# Patient Record
Sex: FEMALE | Race: WHITE | NOT HISPANIC OR LATINO | Employment: FULL TIME | URBAN - METROPOLITAN AREA
[De-identification: names, ages, dates, MRNs, and addresses within clinical notes are randomized per-mention and may not be internally consistent; named-entity substitution may affect disease eponyms.]

---

## 2018-03-25 ENCOUNTER — HOSPITAL ENCOUNTER (EMERGENCY)
Facility: HOSPITAL | Age: 28
Discharge: HOME | End: 2018-03-25
Attending: EMERGENCY MEDICINE | Admitting: EMERGENCY MEDICINE
Payer: OTHER MISCELLANEOUS

## 2018-03-25 VITALS
WEIGHT: 178 LBS | HEART RATE: 83 BPM | DIASTOLIC BLOOD PRESSURE: 70 MMHG | RESPIRATION RATE: 16 BRPM | OXYGEN SATURATION: 100 % | TEMPERATURE: 97.8 F | SYSTOLIC BLOOD PRESSURE: 119 MMHG
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
## 2018-03-25 DIAGNOSIS — T30.0 HOT LIQUID BURN: Primary | ICD-10-CM

## 2018-03-25 PROCEDURE — 99283 EMERGENCY DEPT VISIT LOW MDM: CPT

## 2018-03-25 PROCEDURE — 90471 IMMUNIZATION ADMIN: CPT

## 2018-03-25 PROCEDURE — 90715 TDAP VACCINE 7 YRS/> IM: CPT | Performed by: EMERGENCY MEDICINE

## 2018-03-25 RX ORDER — GINSENG 100 MG
1 CAPSULE ORAL ONCE
Status: COMPLETED | OUTPATIENT
Start: 2018-03-25 | End: 2018-03-25

## 2018-03-25 RX ADMIN — BACITRACIN ZINC 1 LARGE APPLICATION: 500 OINTMENT TOPICAL at 17:27

## 2018-03-25 RX ADMIN — TETANUS TOXOID, REDUCED DIPHTHERIA TOXOID AND ACELLULAR PERTUSSIS VACCINE, ADSORBED 0.5 ML: 5; 2.5; 8; 8; 2.5 SUSPENSION INTRAMUSCULAR at 17:26

## 2018-03-25 NOTE — DISCHARGE INSTRUCTIONS
Second Degree Burn   WHAT YOU NEED TO KNOW:   What is a second degree burn? A second degree burn is also called a partial thickness burn  Your skin contains 3 layers  A second degree burn occurs when the first layer and some of the second layer are burned  This type of burn usually heals within 2 to 3 weeks with some scarring  What are the types of a second degree burn? · A superficial second degree burn  includes the first layer and some of the second layer  There is no damage in the deeper layers or in the sweat glands or oil glands  · A deep second degree burn  includes damage in the middle layer, and in the sweat glands and oil glands  What causes a second degree burn? Direct exposure to heat or flame is the most common cause of second degree burn  This includes contact with hot objects or flames such as an iron, a skillet, tar, cigarettes, or fireworks  The following may also cause a second degree burn:  · Harsh chemicals, such as cleaning products, car battery acid, gasoline, or cement    · Damaged electrical cords or electrical outlets    · Hot water or steam    · Exposure to harmful rays from the sun or from tanning beds  What are the signs and symptoms of a second degree burn? · Superficial second degree burn: The skin is red, moist, very painful to the touch, and has blisters  Areas of redness turn white when pressure is applied  The area returns to red quickly when the pressure is removed  · Deep second degree burn: The skin is mixed red or waxy white, wet or moist, and has no blisters  Some areas of redness may turn white when pressure is applied  The area may return to red slowly or not all when the pressure is removed  How is a second degree burn diagnosed? Your healthcare provider will ask how you were burned  Tell him about your symptoms  He will examine your burn and determine how severe it is  Laser scanners may be used to check the blood flow in your skin     How is a second degree burn treated? · Medicines  may be used to decrease pain, prevent infection, or help your burn heal  They may be given as a pill or as an ointment applied to your skin  · Surgery  may remove damaged tissue, replace or cover lost skin, or relieve pressure and improve blood flow  Surgery can help prevent infection, decrease inflammation, and improve healing  Surgery can also improve the appearance of your skin and reduce scarring  How do I care for my second degree burn? · Wash your hands with soap and water and remove old bandages  You may need to soak the bandage in water before you remove it so it will not stick to your wound  · Gently clean the burned area daily with mild soap and water, and pat dry  Look for any swelling or redness around the burn  Do not break closed blisters, because this increases the risk for infection  · Apply cream or ointment to the burn with a cotton swab  Place a nonstick bandage over your burn  · Wrap a layer of gauze around the bandage to hold it in place  The wrap should be snug but not tight  It is too tight if you feel tingling or lose feeling in that area  · Apply gentle pressure for a few minutes if bleeding occurs  · Elevate your burned arm or leg above the level of your heart as often as you can  This will help decrease swelling and pain  Prop your burned arm or leg on pillows or blankets to keep it elevated comfortably  Why may I need physical therapy? Your muscles and joints may not work well after a second degree burn  A physical therapist teaches you exercises to help improve movement and strength, and to decrease pain  How can I prevent second degree burns? · Do not leave cups, mugs, or bowls containing hot liquids at the edge of a table  Keep pot handles turned away from the stove front  · Do not leave a lit cigarette  Discard it properly   Keep cigarette lighters and matches in a safe place where children cannot reach them     · Keep your water heater setting to low or medium  When should I seek immediate care? · You have a fast heartbeat or breathing  · You are not urinating  When should I contact my healthcare provider? · You have a fever  · You have increased redness, numbness, or swelling in the burn area  · Your wound or bandage is leaking pus and has a bad smell  · Your pain does not get better, or gets worse, even after you take pain medicine  · You have a dry mouth or eyes  · You are overly thirsty or tired  · You have dark yellow urine or urinate less than usual     · You have a headache or feel dizzy  · You have questions or concerns about your condition or care  CARE AGREEMENT:   You have the right to help plan your care  Learn about your health condition and how it may be treated  Discuss treatment options with your caregivers to decide what care you want to receive  You always have the right to refuse treatment  The above information is an  only  It is not intended as medical advice for individual conditions or treatments  Talk to your doctor, nurse or pharmacist before following any medical regimen to see if it is safe and effective for you  © 2017 [employer]  Information is for End User's use only and may not be sold, redistributed or otherwise used for commercial purposes  All illustrations and images included in CareNotes® are the copyrighted property of [employer]  or [employer] Frias  Superficial Burn   WHAT YOU NEED TO KNOW:   What is a superficial burn? A superficial burn, or first-degree burn, is when the outer layer of skin has been burned  What causes a superficial burn? Direct exposure to heat is the most common cause of superficial burn  This includes contact with hot objects or flames, such as an iron, a skillet, cigarettes, or fireworks   The following may also cause a superficial burn:  · Harsh chemicals, such as cleaning products, car battery acid, gasoline, lime, or chlorine    · Damaged electrical cords or electrical outlets    · Hot water or steam     · Sunburns or tanning beds  What are the signs and symptoms of a superficial burn? · Red, dry, tender skin    · Swelling    · Area turns white when touched  How is a superficial burn treated? A superficial burn usually heals in 3 to 5 days without scarring or blisters  Use the following first-aid guide to treat your burn:  · Remove clothing and jewelry immediately  · Flush liquid chemicals from your skin completely with large amounts of cool running water  Do not splash the chemicals into your eyes  · Brush dry chemicals off your skin if large amounts of water are not available  Small amounts of water will activate some chemicals, such as lime, and cause more damage  Do not splash the chemicals into your eyes  · Run cool or cold temperature water over the burned area for 10 minutes  A cold or cool compress can also be put on the burn  Do not use ice or ice water on the affected area  This may cause more damage to the skin  · Use an antibacterial ointment or skin cream, such as aloe vera cream, to soothe the skin  Do not put butter, petroleum jelly, or other home remedies on skin burned by a chemical     · Do not put a bandage on the burn until you are told to do so by your healthcare provider  How can I prevent superficial burns? · Do not leave cups, mugs, or bowls containing hot liquids at the edge of a table  Keep pot handles turned away from the stove front  · Do not leave a lit cigarette  Discard it properly  Keep cigarette lighters and matches in a safe place where children cannot reach them  · Keep your water heater setting to low or medium (90°F to 120°F, or 32°C to 48°C)  · Wear sunscreen that has a sun protectant factor (SPF) of 15 or higher  The sunscreen should also have ultraviolet A (UVA) and ultraviolet B (UVB) protection   Follow the directions on the label when you use sunscreen  Put on more sunscreen if you are in the sun for more than an hour  Reapply sunscreen often if you go swimming or are sweating  Call 911 for any of the following:   · You have trouble breathing  When should I seek immediate care? · You have a burn to the face, neck, hands, or genitals  · Your burn covers a large area such as your trunk or entire arm or leg  · You have increased redness, numbness, or swelling in the superficial burn area  · You have red streaks or blisters spreading outward from the superficial burn  · Your pain is not relieved, or is getting worse even after you take medicine  When should I contact my healthcare provider? · You have a fever  · You have questions or concerns about your condition or care  CARE AGREEMENT:   You have the right to help plan your care  Learn about your health condition and how it may be treated  Discuss treatment options with your caregivers to decide what care you want to receive  You always have the right to refuse treatment  The above information is an  only  It is not intended as medical advice for individual conditions or treatments  Talk to your doctor, nurse or pharmacist before following any medical regimen to see if it is safe and effective for you  © 2017 [employer] [place] Information is for End User's use only and may not be sold, redistributed or otherwise used for commercial purposes  All illustrations and images included in CareNotes® are the copyrighted property of [employer] M [employer]  or Seth Frias

## 2018-03-25 NOTE — ED PROVIDER NOTES
Pt Name: Svetlana Watson  MRN: [id number]  [place] 1990  Age/Sex: 32 y o  female  Date of evaluation: 3/25/2018  PCP: No primary care provider on file  CHIEF COMPLAINT    Chief Complaint   Patient presents with    Burn     patient presents to the ED with burn on left 2nd and 3rd digit after spilling hot coffee on hand at work          HPI    Yue Gilliamner presents to the Emergency Department complaining of burn to left index finger  She was at work and taking the coffee filter part of the  out  She did not realize it still had hot water/coffee in it  This happened at 1:30 PM   She has pain and swelling with some blistering as well  She has been putting a burn cream on it but it is not helping the pain  HPI      Past Medical and Surgical History    History reviewed  No pertinent past medical history  History reviewed  No pertinent surgical history  History reviewed  No pertinent family history  Social History   Substance Use Topics    Smoking status: Current Every Day Smoker     Packs/day: 1 00     Types: Cigarettes    Smokeless tobacco: Not on file    Alcohol use No              Allergies    No Known Allergies    Home Medications    Prior to Admission medications    Not on File           Review of Systems    Review of Systems   Constitutional: Negative for activity change, appetite change, chills, diaphoresis, fatigue and fever  HENT: Negative for congestion, postnasal drip, rhinorrhea, sinus pressure, sneezing and sore throat  Eyes: Negative for pain and visual disturbance  Respiratory: Negative for cough, chest tightness and shortness of breath  Cardiovascular: Negative for chest pain, palpitations and leg swelling  Gastrointestinal: Negative for abdominal distention, abdominal pain, constipation, diarrhea, nausea and vomiting  Endocrine: Negative for polydipsia, polyphagia and polyuria     Genitourinary: Negative for decreased urine volume, difficulty urinating, dysuria, flank pain, frequency and hematuria  Musculoskeletal: Negative for arthralgias, gait problem, joint swelling and neck pain  Skin: Negative for pallor and rash  Allergic/Immunologic: Negative for immunocompromised state  Neurological: Negative for syncope, speech difficulty, weakness, light-headedness, numbness and headaches  All other systems reviewed and are negative  Physical Exam      ED Triage Vitals [03/25/18 1623]   Temperature Pulse Respirations Blood Pressure SpO2   97 8 °F (36 6 °C) 83 16 119/70 100 %      Temp src Heart Rate Source Patient Position - Orthostatic VS BP Location FiO2 (%)   -- -- -- -- --      Pain Score       --               Physical Exam   Constitutional: She is oriented to person, place, and time  She appears well-developed and well-nourished  No distress  HENT:   Head: Normocephalic and atraumatic  Nose: Nose normal    Mouth/Throat: Oropharynx is clear and moist    Eyes: Conjunctivae, EOM and lids are normal  Pupils are equal, round, and reactive to light  Neck: Normal range of motion  Neck supple  Cardiovascular: Normal rate, regular rhythm and normal heart sounds  Exam reveals no gallop and no friction rub  No murmur heard  Pulmonary/Chest: Effort normal and breath sounds normal  No accessory muscle usage  No respiratory distress  She has no wheezes  She has no rales  Abdominal: Soft  She exhibits no distension  There is no tenderness  There is no rebound and no guarding  Musculoskeletal:        Hands:  Neurological: She is alert and oriented to person, place, and time  No cranial nerve deficit or sensory deficit  Skin: Skin is warm and dry  Burn noted  No rash noted  She is not diaphoretic  No erythema  Psychiatric: She has a normal mood and affect  Her speech is normal and behavior is normal  Judgment and thought content normal    Nursing note and vitals reviewed                   Assessment and Plan    Samra Richardson is a 32 y o  female who presents with burn to her hand  Physical examination remarkable for small area of blistering on left index finger  Plan will be to perform diagnostic testing and treat symptomatically  [facility]    Diagnostic Results      Labs:    No results found for this or any previous visit  All labs reviewed and utilized in the medical decision making process    Radiology:    No orders to display       All radiology studies independently viewed by me and interpreted by the radiologist     Procedure    Procedures    CritCare Time      ED Course of Care and Re-Assessments    Tetanus updated  Burn dressing applied  Medications   tetanus-diphtheria-acellular pertussis (BOOSTRIX) IM injection 0 5 mL (0 5 mL Intramuscular Given 3/25/18 1726)   bacitracin topical ointment 1 large application (1 large application Topical Given 3/25/18 1727)           FINAL IMPRESSION    Final diagnoses:   Hot liquid burn         DISPOSITION/PLAN    Time reflects when diagnosis was documented in both MDM as applicable and the Disposition within this note     Time User Action Codes Description Comment    3/25/2018  4:49 PM Gigi SLATER Add [T30 0,  X12  XXXA] Hot liquid burn       ED Disposition     ED Disposition Condition Comment    Discharge  Rosa Maria Burnham discharge to home/self care      Condition at discharge: Good        Follow-up Information     Follow up With Specialties Details Why Contact Info Additional Information    Barbie Seay MD Hand Surgery Schedule an appointment as soon as possible for a visit  [address] [place] [id number]  [address] [place] [address] Emergency Department Emergency Medicine Go to As needed, For wound re-check [address] [facility] [address] [place] ED, [address] [place], [place], [facility]            PATIENT REFERRED TO:    Barbie Seay MD  [address] [place] [id number]  [phone]    Schedule an appointment as soon as possible for a visit      [facility] Emergency Department  [address] [facility] [address] [phone]  Go to  As needed, For wound re-check      DISCHARGE MEDICATIONS:    There are no discharge medications for this patient  No discharge procedures on file           Domenico Peter, [address] [facility], DO  03/25/18 4306

## 2023-04-27 ENCOUNTER — OFFICE VISIT (OUTPATIENT)
Dept: OBGYN CLINIC | Facility: CLINIC | Age: 33
End: 2023-04-27

## 2023-04-27 VITALS
HEART RATE: 86 BPM | RESPIRATION RATE: 18 BRPM | BODY MASS INDEX: 30.83 KG/M2 | WEIGHT: 174 LBS | SYSTOLIC BLOOD PRESSURE: 122 MMHG | OXYGEN SATURATION: 99 % | DIASTOLIC BLOOD PRESSURE: 82 MMHG | HEIGHT: 63 IN

## 2023-04-27 DIAGNOSIS — S46.211A STRAIN OF RIGHT BICEPS, INITIAL ENCOUNTER: ICD-10-CM

## 2023-04-27 DIAGNOSIS — M75.81 TENDINITIS OF RIGHT ROTATOR CUFF: Primary | ICD-10-CM

## 2023-04-27 RX ORDER — NAPROXEN 500 MG/1
TABLET ORAL
COMMUNITY
Start: 2023-02-06

## 2023-04-27 NOTE — PROGRESS NOTES
"     Subjective:  Chief Complaint   Patient presents with   • Right Arm - Pain       Barrett Lawrence is a 35 y o  female complains of right shoulder pain  Onset of the symptoms was several weeks ago  Mechanism of injury: fell at work while moving box of [employer], fell onto her right shoulder   Aggravating factors: lifting items wiht her right shoulder  Treatment to date: sling   Symptoms have gradually improved  Patient was seen in the emergency room where initial radiographs were obtained revealing no acute fractures or dislocations  Patient was placed into a sling and advised to follow-up for initial evaluation in office today  The following portions of the patient's history were reviewed and updated as appropriate: allergies, current medications, past family history, past medical history, past social history, past surgical history and problem list     Occupation:      Review of Systems   Constitutional: Negative for fever  HENT: Negative for dental problem and headaches  Eyes: Negative for vision loss  Respiratory: Negative for cough and shortness of breath  Cardiovascular: Negative for leg swelling and palpitations  Gastrointestinal: Negative for constipation and diarrhea  Genitourinary: Negative for bladder incontinence and difficulty urinating  Musculoskeletal: Negative for back pain and difficulty walking  Skin: Negative for rash and ulcer  Neurological: Negative for dizziness and headaches  Hem/Lymph/Immuno: Negative for blood clots  Does not bruise/bleed easily  Psychiatric/Behavioral: Negative for confusion           Objective:  /82   Pulse 86   Resp 18   Ht 5' 3\" (1 6 m)   Wt 78 9 kg (174 lb)   SpO2 99%   BMI 30 82 kg/m²     Skin: no rashes, lesions, skin discolorations, lacerations  Vasculature: normal radial and ulnar pulse,  normal skin color, normal capillary refill in extremity, no upper extremity edema  Neurologic: Neurologic exam is normal throughout upper " extremities, Awake, alert, and oriented x3, no apparent distress  Musculoskeletal:   right SHOULDER EXAM    Intact skin  No erythema, no induration, no signs of infection  No gross deformity    AROM FF: 180 degrees  AROM ER with arm at its side: 90 degrees  AROM IR: 80 degrees    Grind test: negative    Supraspinatus strength testin/5  Infraspinatus strength testin/5    Belly press: negative  Bear Hug test: negative    Empty can: positive  Biceps TTP: positive  Arc test: positive    Wasco's test:negative  Scapular posturing: good    Tenderness to palpation of AC joint: negative  Pain with cross-body adduction: negative    Anterior glide: negative  Posterior load and shift: negative  Sulcus sign: negative    Positive speeds    Imaging:    XR shoulder 2+ views RIGHT    Result Date: 2023  Narrative: RIGHT SHOULDER INDICATION:   injury  Majo Freeze at work today  Right shoulder pain  COMPARISON:  None VIEWS:  XR SHOULDER 2+ VW RIGHT FINDINGS: There is no acute fracture or dislocation  No significant degenerative changes  No lytic or blastic osseous lesion  Soft tissues are unremarkable  Impression: No acute osseous abnormality  Workstation performed: [id number]     XR humerus RIGHT    Result Date: 2023  Narrative: RIGHT HUMERUS INDICATION:  Fall onto right arm, right arm pain  COMPARISON:  None VIEWS:  XR HUMERUS RIGHT Images: 2 FINDINGS: There is no acute fracture or dislocation  No significant degenerative changes  No lytic or blastic osseous lesion  Soft tissues are unremarkable  Impression: No acute osseous abnormality  Workstation performed: [id number]        Assessment/Plan:  1  Tendinitis of right rotator cuff    - Ambulatory Referral to Physical Therapy; Future    2   Strain of right biceps, initial encounter        > 30 min devoted to review of previous, pertinent medical records, imaging, discussion of treatment options, counseling and documentation  Clinical impression is that patient right shoulder pain is secondary to biceps strain and rotator cuff strain  She does not exhibit any weakness on exam with rotator cuff testing  No bruising or ecchymosis or swelling appreciated  We discussed the nature of rotator cuff strain, biceps strain at length and detailed the treatment approach  Directed to ice the area daily for 20 minutes at a time using a barrier to protect the skin- stressed specifically icing after activity to address inflammation  Start Naproxen 500 mg PO BID for 10 days with food, then to be used as needed moving forward  They were instructed to discontinue this medication is they experienced any upset stomach  Recommending formal PT- Rx provided for PT  Follow up in 6 weeks  Should sx's worsen or any concerns arise, they were advised to follow up sooner or seek more immediate medical attention  All of the patient's concerns were addressed and questions answered  They verbalized agreement with and understanding of the treatment plan

## 2023-05-09 ENCOUNTER — EVALUATION (OUTPATIENT)
Dept: PHYSICAL THERAPY | Facility: CLINIC | Age: 33
End: 2023-05-09

## 2023-05-09 DIAGNOSIS — M75.81 TENDINITIS OF RIGHT ROTATOR CUFF: Primary | ICD-10-CM

## 2023-05-09 NOTE — PROGRESS NOTES
PT Evaluation     Today's date: 2023  Patient name: Padmini Givens  : 1990  MRN: [id number]  Referring provider: Giulia Rouse DO  Dx:   Encounter Diagnosis     ICD-10-CM    1  Tendinitis of right rotator cuff  M75 81 Ambulatory Referral to Physical Therapy     PT plan of care cert/re-cert          Start Time: 930  Stop Time: 1015  Total time in clinic (min): 45 minutes    Assessment  Assessment details: Pt is a 35 y o  female presenting to physical therapy with chief complaint of R shoulder pain  Pt presents with decreased ROM of R shoulder and decreased strength of R rotator cuff muscles and scapular stabilizers  Pt's impairments are resulting in limitations with reaching, lifting and carrying objects, overhead activity, and work/household activities  PT POC will focus on improving R rotator cuff strength/stability, as well as promoting proper upright posture, in order to decrease shoulder impingement and improve pain free ROM  Pt is a good candidate for skilled PT to address impairments and facilitate return to prior level of function  Impairments: abnormal muscle tone, abnormal or restricted ROM, impaired physical strength, lacks appropriate home exercise program, pain with function and poor posture   Functional limitations: reaching, lifting and carrying objects, overhead activity, and work/household activities  Symptom irritability: moderateUnderstanding of Dx/Px/POC: good   Prognosis: good    Goals  STG 4 - weeks  1  Patient will demonstrate improved R shoulder flexion/abduction ROM by 10 degrees or more to facilitate functional ability  2  Patient will demonstrate improved R shoulder strength to 4/5 or better in all planes to facilitate functional ability  LTG 8 - weeks  1  Patient will demonstrate ability to lift 10 pounds or more overhead with good R shoulder mechanics to facilitate functional ability    2  Patient will demonstrate increased FOTO score from 36 at baseline to predicted score of 66 or higher to facilitate functional ability  Plan  Planned modality interventions: cryotherapy, thermotherapy: hydrocollator packs and unattended electrical stimulation  Planned therapy interventions: ADL retraining, activity modification, functional ROM exercises, home exercise program, joint mobilization, manual therapy, massage, neuromuscular re-education, patient education, strengthening, therapeutic exercise, therapeutic activities, stretching and postural training  Frequency: 2x week  Duration in weeks: 8  Plan of Care beginning date: 2023  Plan of Care expiration date: 2023  Treatment plan discussed with: patient        Subjective Evaluation    History of Present Illness  Mechanism of injury: Pt reports R shoulder pain secondary to fall at work on   Pt fell backwards and reached her R arm back to brace her fall  Pt had instant pain and went to ED and x-rays were negative  Pt placed in sling and then saw ortho who removed her from sling  Ortho also prescribed naproxen 2x/day which she has been taking  Pt reports pain has been improving since the incident, still getting pain shooting down her upper arm, no distal sx to her hand/fingers  Pt can't sleep on her R side, as well as having difficulty with overhead activities such as doing hair  Pt is back to work, has been receiving help from coworkers for carrying heavier objects  Pt not required for overhead lifting at work but needs to do floor to waist carrying, is getting some pain when she needs to pull upwards to lift    Pain  Current pain ratin  At best pain ratin  At worst pain ratin  Location: R shoulder and medial upper arm  Quality: throbbing (Thumping)  Relieving factors: rest  Aggravating factors: lifting and overhead activity  Progression: improved    Patient Goals  Patient goals for therapy: increased motion and decreased pain  Patient goal: Move her R arm without hurting        Objective     Postural Observations  Seated "posture: poor  Standing posture: poor    Additional Postural Observation Details  Forward head/rounded shoulder    Palpation   Left   No palpable tenderness to the upper trapezius  Right   Hypertonic in the upper trapezius  Tenderness     Additional Tenderness Details  No TTP R shoulder    Active Range of Motion   Cervical/Thoracic Spine       Cervical    Left lateral flexion: 30 degrees      Right lateral flexion: 25 degrees     with pain  Left Shoulder   Internal rotation BTB: T7     Right Shoulder   Flexion: 125 degrees   Abduction: 95 degrees   External rotation 45°: 55 degrees   Internal rotation 45°: 65 degrees   Internal rotation BTB: T12     Passive Range of Motion     Right Shoulder   Flexion: 105 degrees   Abduction: 95 degrees with pain  Mechanical Assessment    Cervical    Seated retraction: repeated movements   Pain location: no change  Seated Flexion:  repeated movements  Pain location: no change  Seated right sidebend: sustained positions  Pain intensity: worse    Thoracic      Lumbar      Strength/Myotome Testing     Left Shoulder     Planes of Motion   Flexion: 4+   Abduction: 4+   External rotation at 0°: 4+   External rotation at 90°: 4   Internal rotation at 0°: 5     Right Shoulder     Planes of Motion   Flexion: 4-   Abduction: 3   External rotation at 0°: 3+   External rotation at 90°: 3   Internal rotation at 0°: 4+     Left Elbow   Flexion: 4    Right Elbow   Flexion: 4-    Tests     Right Shoulder   Positive Hawkin's, Neer's and passive horizontal adduction  Negative empty can                Precautions: N/A      Manuals 5/9            PROM R shoulder                                                    Neuro Re-Ed             Pt education about posture, RTC, POC, HEP 10'            Corner pinches 1x10 5\"            Chin tucks 1x10 5\"            Serratus punch                                                    Ther Ex             Table slides flex 1x10 5\"            ER AAROM c cane " "1x10 5\"            ER/abd isometric             Pulleys                                                                 Ther Activity                                       Gait Training                                       Modalities                                          "

## 2023-05-16 ENCOUNTER — OFFICE VISIT (OUTPATIENT)
Dept: PHYSICAL THERAPY | Facility: CLINIC | Age: 33
End: 2023-05-16

## 2023-05-16 DIAGNOSIS — M75.81 TENDINITIS OF RIGHT ROTATOR CUFF: Primary | ICD-10-CM

## 2023-05-16 NOTE — PROGRESS NOTES
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 but after 3-4 she will have tingling to her elbow  Objective: See treatment diary below      Assessment: Initiated POC as charted with good tolerance  Patient will push herself, cues to rest and perform exercises within tolerable ROM  Improved ROM with PROM able to elevate above shoulder height  She was more challenge with AAROM in supine achieving around 70-80* flex  Added no moneys RTC strengthening, she had some discomfort medial bicep but returned to baseline after each contraction  Patient demonstrated fatigue post treatment and would benefit from continued PT      Plan: Progress treatment as tolerated         Precautions: N/A      Manuals            PROM R shoulder                                                    Neuro Re-Ed             Pt education about posture, RTC, POC, HEP 10'            Corner pinches 1x10 5\" 2x10 5\"           Chin tucks 1x10 5\" 2x10 5\"           Serratus punch  supine 2x10 3\"           No moneys  Peach 2x10                                      Ther Ex             Table slides flex 1x10 5\"  [id number] ea 5\"           ER AAROM c cane 1x10 5\" 2x10 5\"           ER/abd isometric  abd 1x10 5\"           Pulleys  3' scaption            Cane flexion AAROM  supine 2x10                                                   Ther Activity             Box lifts  + squat mechanics  2'  Box only 10x                        Gait Training                                       Modalities                                            "

## 2023-05-18 ENCOUNTER — APPOINTMENT (OUTPATIENT)
Dept: PHYSICAL THERAPY | Facility: CLINIC | Age: 33
End: 2023-05-18
Payer: COMMERCIAL

## 2023-05-23 ENCOUNTER — APPOINTMENT (OUTPATIENT)
Dept: PHYSICAL THERAPY | Facility: CLINIC | Age: 33
End: 2023-05-23
Payer: COMMERCIAL

## 2023-05-23 NOTE — PROGRESS NOTES
"Daily Note     Today's date: 2023  Patient name: Sylvain Borrero  : 1990  MRN: [id number]  Referring provider: Wilma Contreras DO  Dx: No diagnosis found  Subjective: ***      Objective: See treatment diary below      Assessment: Tolerated treatment {Tolerated treatment :}   Patient {assessment:[id number]}      Plan: {PLAN:}     Precautions: N/A      Manuals            PROM R shoulder                                                    Neuro Re-Ed             Pt education about posture, RTC, POC, HEP 10'            Corner pinches 1x10 5\" 2x10 5\"           Chin tucks 1x10 5\" 2x10 5\"           Serratus punch  supine 2x10 3\"           No moneys  Peach 2x10                                      Ther Ex             Table slides flex 1x10 5\"  [id number] ea 5\"           ER AAROM c cane 1x10 5\" 2x10 5\"           ER/abd isometric  abd 1x10 5\"           Pulleys  3' scaption            Cane flexion AAROM  supine 2x10                                                   Ther Activity             Box lifts  + squat mechanics  2'  Box only 10x                        Gait Training                                       Modalities                                            "

## 2023-05-25 ENCOUNTER — OFFICE VISIT (OUTPATIENT)
Dept: PHYSICAL THERAPY | Facility: CLINIC | Age: 33
End: 2023-05-25

## 2023-05-25 DIAGNOSIS — M75.81 TENDINITIS OF RIGHT ROTATOR CUFF: Primary | ICD-10-CM

## 2023-05-30 ENCOUNTER — APPOINTMENT (OUTPATIENT)
Dept: PHYSICAL THERAPY | Facility: CLINIC | Age: 33
End: 2023-05-30
Payer: COMMERCIAL

## 2023-06-06 ENCOUNTER — OFFICE VISIT (OUTPATIENT)
Dept: PHYSICAL THERAPY | Facility: CLINIC | Age: 33
End: 2023-06-06
Payer: COMMERCIAL

## 2023-06-06 DIAGNOSIS — M75.81 TENDINITIS OF RIGHT ROTATOR CUFF: Primary | ICD-10-CM

## 2023-06-06 PROCEDURE — 97530 THERAPEUTIC ACTIVITIES: CPT

## 2023-06-06 PROCEDURE — 97110 THERAPEUTIC EXERCISES: CPT

## 2023-06-06 PROCEDURE — 97140 MANUAL THERAPY 1/> REGIONS: CPT

## 2023-06-06 NOTE — PROGRESS NOTES
"Daily Note     Today's date: 2023  Patient name: Bernard Cantor  : 1990  MRN: [id number]  Referring provider: Madyson Jamison DO  Dx:   Encounter Diagnosis     ICD-10-CM    1  Tendinitis of right rotator cuff  M75 81           Start Time: 1230  Stop Time: 1315  Total time in clinic (min): 45 minutes    Subjective: Pt reports overall her shoulder has been feeling better, able to lift it higher now  Pt still having some difficulty lifting heavier objects  Objective: See treatment diary below      Assessment: Tolerated treatment well  Patient demonstrated fatigue post treatment, exhibited good technique with therapeutic exercises and would benefit from continued PT  Pt demonstrating good progress with shoulder ROM to this point, still mostly limited with abduction but responded well to manuals  Progressed strengthening exercises with overhead lifting as tolerated, pt reports slight soreness after performance but no significant discomfort  Plan: Continue per plan of care  Progress treatment as tolerated         Precautions: N/A      Manuals  6/6         PROM R shoulder flex/abd    6'         R shoulder inferior mobs    3' end range                                   Neuro Re-Ed             Pt education about HEP 10'   4'         Corner pinches 1x10 5\" 2x10 5\" 2x10 5\"          Chin tucks 1x10 5\" 2x10 5\" 2x10 5\" 2x10 5\"         Serratus punch  supine 2x10 3\" supine 2x10 3\"          No moneys  Peach 2x10  orange 3x10                                     Ther Ex             Table slides scap 1x10 5\"  [id number] ea 5\" 1x15 5\" 1x15 5\"          ER AAROM c cane 1x10 5\" 2x10 5\" 2x10 5\"          ER/abd isometric  abd 1x10 5\" 1x15 5\" ea          Pulleys  3' scaption  3' scaptio 3' scaptio         Cane flexion AAROM  supine 2x10  supine 2x10           Sleeper stretch    10x10\"         UBE    2'/3'         TB ER     2x10 ylw         Ther Activity             Box lifts  + squat mechanics  2'  Box only " 10x + squat mechanics  2'  Box only 10x 2x5 overhead passes to stairs; box only         Standing flexion to 90     2x10 5#         Gait Training                                       Modalities

## 2023-06-08 ENCOUNTER — OFFICE VISIT (OUTPATIENT)
Dept: PHYSICAL THERAPY | Facility: CLINIC | Age: 33
End: 2023-06-08
Payer: COMMERCIAL

## 2023-06-08 ENCOUNTER — OFFICE VISIT (OUTPATIENT)
Dept: OBGYN CLINIC | Facility: CLINIC | Age: 33
End: 2023-06-08
Payer: COMMERCIAL

## 2023-06-08 VITALS
DIASTOLIC BLOOD PRESSURE: 72 MMHG | SYSTOLIC BLOOD PRESSURE: 150 MMHG | HEIGHT: 63 IN | BODY MASS INDEX: 30.65 KG/M2 | WEIGHT: 173 LBS | HEART RATE: 71 BPM

## 2023-06-08 DIAGNOSIS — M75.81 TENDINITIS OF RIGHT ROTATOR CUFF: Primary | ICD-10-CM

## 2023-06-08 DIAGNOSIS — R20.2 PARESTHESIA OF RIGHT ARM: ICD-10-CM

## 2023-06-08 PROCEDURE — 97112 NEUROMUSCULAR REEDUCATION: CPT

## 2023-06-08 PROCEDURE — 99213 OFFICE O/P EST LOW 20 MIN: CPT | Performed by: FAMILY MEDICINE

## 2023-06-08 PROCEDURE — 97110 THERAPEUTIC EXERCISES: CPT

## 2023-06-08 NOTE — PROGRESS NOTES
"     Subjective:  Chief Complaint   Patient presents with   • Right Shoulder - Pain, Follow-up       Dessie Boeck is a 35 y o  female presenting for follow-up visit in regards to right shoulder pain  Patient sustained the injury several weeks ago while moving a small box of Gatorade falling onto the right shoulder  She was referred to physical therapy for suspected rotator cuff tendinitis and has reported significant improvement in terms of pain symptoms and motion  She reports about 85% improvement from initial evaluation  She does report occasional numbness and tingling on the medial aspect of the arm which does not go distal the elbow  She denies any neck pain  The following portions of the patient's history were reviewed and updated as appropriate: allergies, current medications, past family history, past medical history, past social history, past surgical history and problem list     Occupation:      Review of Systems   Constitutional: Negative for fever  HENT: Negative for dental problem and headaches  Eyes: Negative for vision loss  Respiratory: Negative for cough and shortness of breath  Cardiovascular: Negative for leg swelling and palpitations  Gastrointestinal: Negative for constipation and diarrhea  Genitourinary: Negative for bladder incontinence and difficulty urinating  Musculoskeletal: Negative for back pain and difficulty walking  Skin: Negative for rash and ulcer  Neurological: Negative for dizziness and headaches  Hem/Lymph/Immuno: Negative for blood clots  Does not bruise/bleed easily  Psychiatric/Behavioral: Negative for confusion           Objective:  /72   Pulse 71   Ht 5' 3\" (1 6 m)   Wt 78 5 kg (173 lb)   BMI 30 65 kg/m²     Skin: no rashes, lesions, skin discolorations, lacerations  Vasculature: normal radial and ulnar pulse,  normal skin color, normal capillary refill in extremity, no upper extremity edema  Neurologic: Neurologic exam is normal " throughout upper extremities, Awake, alert, and oriented x3, no apparent distress  Musculoskeletal:   right SHOULDER EXAM    Intact skin  No erythema, no induration, no signs of infection  No gross deformity    AROM FF: 180 degrees  AROM ER with arm at its side: 90 degrees  AROM IR: 80 degrees    Grind test: negative    Supraspinatus strength testin/5  Infraspinatus strength testin/5    Belly press: negative  Bear Hug test: negative    Empty can: positive  Biceps TTP: positive  Arc test: positive    Lewis and [employer]'s test:negative  Scapular posturing: good    Tenderness to palpation of AC joint: negative  Pain with cross-body adduction: negative    Anterior glide: negative  Posterior load and shift: negative  Sulcus sign: negative    Negative speeds    Negative Spurling's  No sensory deficits to light touch in the upper extremities bilaterally  5 out of 5 strength with biceps and triceps testing      Imaging:    XR shoulder 2+ views RIGHT    Result Date: 2023  Narrative: RIGHT SHOULDER INDICATION:   injury  Margi Caras at work today  Right shoulder pain  COMPARISON:  None VIEWS:  XR SHOULDER 2+ VW RIGHT FINDINGS: There is no acute fracture or dislocation  No significant degenerative changes  No lytic or blastic osseous lesion  Soft tissues are unremarkable  Impression: No acute osseous abnormality  Workstation performed: [id number]     XR humerus RIGHT    Result Date: 2023  Narrative: RIGHT HUMERUS INDICATION:  Fall onto right arm, right arm pain  COMPARISON:  None VIEWS:  XR HUMERUS RIGHT Images: 2 FINDINGS: There is no acute fracture or dislocation  No significant degenerative changes  No lytic or blastic osseous lesion  Soft tissues are unremarkable  Impression: No acute osseous abnormality  Workstation performed: [id number]        Assessment/Plan:  1  Tendinitis of right rotator cuff      2  Paresthesia of right arm    - EMG 1 Limb;  Future    Patient has achieved improvement in terms of pain symptoms with physical therapy for rotator cuff tendinitis  She does have occasional numbness and tingling in the medial aspect of the right arm which raises suspicion for possible concomitant brachial plexus versus cervical radiculopathy pathology  Rotator cuff strength testing has improved although she is exhibiting mild weakness with infraspinatus testing  I am recommending patient follow-up with an EMG study to evaluate for both brachial plexus versus cervical radiculopathy pathology  Advised patient to continue with physical therapy in the interim until EMG results return    We will follow-up once results return and discuss treatment plan moving forward

## 2023-06-08 NOTE — PROGRESS NOTES
"Daily Note     Today's date: 2023  Patient name: Lennox Service  : 1990  MRN: [id number]  Referring provider: Janie Rangel DO  Dx:   Encounter Diagnosis     ICD-10-CM    1  Tendinitis of right rotator cuff  M75 81           Start Time: 930  Stop Time: 1000  Total time in clinic (min): 30 minutes    Subjective: Pt reports feeling a little soreness after previous session, but not too bad and did not feel like she overdid it in PT  Objective: See treatment diary below      Assessment: Tolerated treatment well  Patient demonstrated fatigue post treatment, exhibited good technique with therapeutic exercises and would benefit from continued PT  Session abbreviated due to pt having ortho appointment and needing to leave early  Continued with PROM and joint mobs to facilitate regaining R shoulder abduction and progressed strengthening/stabilization exercises  Pt tolerated progressions well and will continue with rotator cuff strengthening progressions next session  Discussed PT POC with pt, pt agreeable that she has made good progress and would benefit from continued PT sessions to facilitate further functional improvement  Plan: Continue per plan of care  Progress treatment as tolerated         Precautions: N/A      Manuals         PROM R shoulder flex/abd    6' 4'        R shoulder inferior mobs    3' end range 2'                                  Neuro Re-Ed             Pt education  10'   4' 3'        Corner pinches 1x10 5\" 2x10 5\" 2x10 5\"          Chin tucks 1x10 5\" 2x10 5\" 2x10 5\" 2x10 5\"         Serratus punch  supine 2x10 3\" supine 2x10 3\"  2x10 against wall        No moneys  Peach 2x10  orange 3x10           TB rows     3x10 blue        Prone I/T     NV        Ther Ex             Table slides scap 1x10 5\"  [id number] ea 5\" 1x15 5\" 1x15 5\"          ER AAROM c cane 1x10 5\" 2x10 5\" 2x10 5\"          ER/abd isometric  abd 1x10 5\" 1x15 5\" ea          Pulleys  3' scaption  3' " "scaptio 3' scaptio         Cane flexion AAROM  supine 2x10  supine 2x10           Sleeper stretch    10x10\"         UBE    2'/3' 3'/3' lvl 1 3        TB ER     2x10 ylw 2x15 grn        TB ER punch up     NV        Ther Activity             Box lifts overhead to stairs c PT  + squat mechanics  2'  Box only 10x + squat mechanics  2'  Box only 10x 2x5 overhead passes to stairs; box only 1x5 box, 1x5 box+3#        Standing flexion to 90     2x10 5#         Gait Training                                       Modalities                                            "

## 2023-06-13 ENCOUNTER — APPOINTMENT (OUTPATIENT)
Dept: PHYSICAL THERAPY | Facility: CLINIC | Age: 33
End: 2023-06-13
Payer: COMMERCIAL

## 2023-06-20 ENCOUNTER — OFFICE VISIT (OUTPATIENT)
Dept: PHYSICAL THERAPY | Facility: CLINIC | Age: 33
End: 2023-06-20
Payer: COMMERCIAL

## 2023-06-20 DIAGNOSIS — M75.81 TENDINITIS OF RIGHT ROTATOR CUFF: Primary | ICD-10-CM

## 2023-06-20 PROCEDURE — 97530 THERAPEUTIC ACTIVITIES: CPT

## 2023-06-20 PROCEDURE — 97110 THERAPEUTIC EXERCISES: CPT

## 2023-06-20 PROCEDURE — 97140 MANUAL THERAPY 1/> REGIONS: CPT

## 2023-06-20 NOTE — PROGRESS NOTES
"Daily Note     Today's date: 2023  Patient name: Suri Smiley  : 1990  MRN: [id number]  Referring provider: Venessa Jarrett DO  Dx:   Encounter Diagnosis     ICD-10-CM    1  Tendinitis of right rotator cuff  M75 81           Start Time: 1015  Stop Time: 1100  Total time in clinic (min): 45 minutes    Subjective: Pt reports her shoulder isn't feeling too bad, but she pushed it at work yesterday and it was very sore  Objective: See treatment diary below      Assessment: Tolerated treatment well  Patient demonstrated fatigue post treatment, exhibited good technique with therapeutic exercises and would benefit from continued PT  Pt's RUE sx not brought on with upper limb tension test 2 for median nerve and not altered by cervical side bending when arm raised overhead  However, pt does demonstrate increased median nerve sensitivity in RUE compared to LUE when arm raised into abduction overhead  Performed some median nerve glides distally at wrist, pt tolerated fair, did have some soreness after performance and after rotator cuff strengthening exercises  Plan: Continue per plan of care  Progress treatment as tolerated  Precautions: N/A    POC expires Auth Status Unit limit Start date  Expiration date PT/OT + Visit Limit?     approved N/A 23 BOMN             Visit/Unit Tracking  AUTH Status:  Date   FOTO         12 visits Used      6          Remaining       6               Manuals  6       PROM R shoulder flex/abd    6' 4' 4'       R shoulder inferior mobs    3' end range 2' 3'       R median nerve glide      2' distal glide                    Neuro Re-Ed             Pt education  10'   4' 3' 2'       Corner pinches 1x10 5\" 2x10 5\" 2x10 5\"          Chin tucks 1x10 5\" 2x10 5\" 2x10 5\" 2x10 5\"  2x10 5\"       Serratus punch  supine 2x10 3\" supine 2x10 3\"  2x10 against wall 2x10 against wall       No moneys  Peach 2x10  orange 3x10        " "   TB rows     3x10 blue 3x10 blk       Prone I/T     NV 1x10 3\" ea         Ther Ex             Table slides scap 1x10 5\"  [id number] ea 5\" 1x15 5\" 1x15 5\"          Wall slides      1x15 5\"       Pulleys  3' scaption  3' scaptio 3' scaptio         Cane flexion AAROM  supine 2x10  supine 2x10           Sleeper stretch    10x10\"         UBE    2'/3' 3'/3' lvl 1 3 3'/3' lvl 1 5       TB ER     2x10 ylw 2x15 grn NP       TB ER punch up     NV x10 red       Ther Activity             Box lifts overhead to stairs c PT  + squat mechanics  2'  Box only 10x + squat mechanics  2'  Box only 10x 2x5 overhead passes to stairs; box only 1x5 box, 1x5 box+3# NP       Standing flexion to 90     2x10 5#         Gait Training                                       Modalities                                            "

## 2023-06-22 ENCOUNTER — APPOINTMENT (OUTPATIENT)
Dept: PHYSICAL THERAPY | Facility: CLINIC | Age: 33
End: 2023-06-22
Payer: COMMERCIAL

## 2023-06-27 ENCOUNTER — OFFICE VISIT (OUTPATIENT)
Dept: PHYSICAL THERAPY | Facility: CLINIC | Age: 33
End: 2023-06-27
Payer: COMMERCIAL

## 2023-06-27 DIAGNOSIS — M75.81 TENDINITIS OF RIGHT ROTATOR CUFF: Primary | ICD-10-CM

## 2023-06-27 PROCEDURE — 97110 THERAPEUTIC EXERCISES: CPT

## 2023-06-27 PROCEDURE — 97530 THERAPEUTIC ACTIVITIES: CPT

## 2023-06-27 NOTE — PROGRESS NOTES
"Daily Note     Today's date: 2023  Patient name: Merrick Sarabia  : 1990  MRN: [id number]  Referring provider: Matt Aggarwal DO  Dx:   Encounter Diagnosis     ICD-10-CM    1  Tendinitis of right rotator cuff  M75 81           Start Time: 1015  Stop Time: 1100  Total time in clinic (min): 45 minutes    Subjective: Patient reports she was sore after last session however she has been trying to push herself to do more  She reports she has been icing at home  Objective: See treatment diary below      Assessment: Tolerated treatment well  Cuing given throughout exercise performance with good carryover seen  Focused pm proper spine alignment during exercises as well as elimination of UT activation  Patient demonstrated fatigue post treatment, exhibited good technique with therapeutic exercises and would benefit from continued PT to meet functional goals  Plan: Continue per plan of care  Progress treatment as tolerated  Precautions: N/A    POC expires Auth Status Unit limit Start date  Expiration date PT/OT + Visit Limit?  approved N/A 23 BOMN             Visit/Unit Tracking  AUTH Status:  Date  6  FOTO         12 visits Used      6          Remaining       6               Manuals       PROM R shoulder flex/abd    6' 4' 4' KL      R shoulder inferior mobs    3' end range 2' 3'       R median nerve glide      2' distal glide                    Neuro Re-Ed             Pt education  10'   4' 3' 2' R median nerve glide 10x      Corner pinches 1x10 5\" 2x10 5\" 2x10 5\"    2x10 5\"      Chin tucks 1x10 5\" 2x10 5\" 2x10 5\" 2x10 5\"  2x10 5\" 2x10 5\"      Serratus punch  supine 2x10 3\" supine 2x10 3\"  2x10 against wall 2x10 against wall 2x10 against wall      No moneys  Peach 2x10  orange 3x10           TB rows     3x10 blue 3x10 blk 3x10 blk      Prone I/T     NV 1x10 3\" ea  1x10 3\" ea        Ther Ex             Table slides scap 1x10 5\" " " [id number] ea 5\" 1x15 5\" 1x15 5\"          Wall slides      1x15 5\" 1x20 5\"      Pulleys  3' scaption  3' scaptio 3' scaptio         Cane flexion AAROM  supine 2x10  supine 2x10           Sleeper stretch    10x10\"         UBE    2'/3' 3'/3' lvl 1 3 3'/3' lvl 1 5 3'/3' lvl 3 fwd  lvl 1 5 bwd      TB ER     2x10 ylw 2x15 grn NP 2x15 grn      TB ER punch up     NV x10 red x10 red      Ther Activity             Box lifts overhead to stairs c PT  + squat mechanics  2'  Box only 10x + squat mechanics  2'  Box only 10x 2x5 overhead passes to stairs; box only 1x5 box, 1x5 box+3# NP       Standing flexion to 90     2x10 5#         Gait Training                                       Modalities                                            "

## 2023-06-29 ENCOUNTER — APPOINTMENT (OUTPATIENT)
Dept: PHYSICAL THERAPY | Facility: CLINIC | Age: 33
End: 2023-06-29
Payer: COMMERCIAL

## 2023-06-29 DIAGNOSIS — M75.81 TENDINITIS OF RIGHT ROTATOR CUFF: Primary | ICD-10-CM

## 2023-06-29 NOTE — PROGRESS NOTES
"Daily Note     Today's date: 2023  Patient name: Samra Yang  : 1990  MRN: [id number]  Referring provider: Dick Elizabeth DO  Dx:   Encounter Diagnosis     ICD-10-CM    1  Tendinitis of right rotator cuff  M75 81                      Subjective: ***      Objective: See treatment diary below      Assessment: Tolerated treatment {Tolerated treatment :3361111214}  Patient {assessment:[id number]}      Plan: {PLAN:3617990713}     Precautions: N/A    POC expires Auth Status Unit limit Start date  Expiration date PT/OT + Visit Limit?  approved N/A 23 [facility]             Visit/Unit Tracking  AUTH Status:  Date   FOTO         12 visits Used      6          Remaining       6               Manuals      PROM R shoulder flex/abd    6' 4' 4' KL      R shoulder inferior mobs    3' end range 2' 3'       R median nerve glide      2' distal glide                    Neuro Re-Ed             Pt education  10'   4' 3' 2' R median nerve glide 10x      Corner pinches 1x10 5\" 2x10 5\" 2x10 5\"    2x10 5\"      Chin tucks 1x10 5\" 2x10 5\" 2x10 5\" 2x10 5\"  2x10 5\" 2x10 5\"      Serratus punch  supine 2x10 3\" supine 2x10 3\"  2x10 against wall 2x10 against wall 2x10 against wall      No moneys  Peach 2x10  orange 3x10           TB rows     3x10 blue 3x10 blk 3x10 blk      Prone I/T     NV 1x10 3\" ea  1x10 3\" ea        Ther Ex             Table slides scap 1x10 5\"  [id number] ea 5\" 1x15 5\" 1x15 5\"          Wall slides      1x15 5\" 1x20 5\"      Pulleys  3' scaption  3' scaptio 3' scaptio         Cane flexion AAROM  supine 2x10  supine 2x10           Sleeper stretch    10x10\"         UBE    2'/3' 3'/3' lvl 1 3 3'/3' lvl 1 5 3'/3' lvl 3 fwd  lvl 1 5 bwd      TB ER     2x10 ylw 2x15 grn NP 2x15 grn      TB ER punch up     NV x10 red x10 red      Ther Activity             Box lifts overhead to stairs c PT  + squat mechanics  2'  Box only 10x + squat " mechanics  2'  Box only 10x 2x5 overhead passes to stairs; box only 1x5 box, 1x5 box+3# NP       Standing flexion to 90     2x10 5#         Gait Training                                       Modalities

## 2023-07-03 ENCOUNTER — APPOINTMENT (OUTPATIENT)
Dept: PHYSICAL THERAPY | Facility: CLINIC | Age: 33
End: 2023-07-03
Payer: COMMERCIAL

## 2023-07-11 ENCOUNTER — EVALUATION (OUTPATIENT)
Dept: PHYSICAL THERAPY | Facility: CLINIC | Age: 33
End: 2023-07-11
Payer: COMMERCIAL

## 2023-07-11 DIAGNOSIS — M75.81 TENDINITIS OF RIGHT ROTATOR CUFF: Primary | ICD-10-CM

## 2023-07-11 PROCEDURE — 97112 NEUROMUSCULAR REEDUCATION: CPT

## 2023-07-11 PROCEDURE — 97140 MANUAL THERAPY 1/> REGIONS: CPT

## 2023-07-11 PROCEDURE — 97110 THERAPEUTIC EXERCISES: CPT

## 2023-07-11 NOTE — PROGRESS NOTES
PT Re-Evaluation     Today's date: 2023  Patient name: Vishal Fenton  : 1990  MRN: [id number]  Referring provider: Heather Saravia DO  Dx:   Encounter Diagnosis     ICD-10-CM    1. Tendinitis of right rotator cuff  M75.81           Start Time: 08  Stop Time: 845  Total time in clinic (min): 40 minutes    Assessment  Assessment details: Pt is a 35 y.o. female who has been attending physical therapy with chief complaint of R shoulder pain. Upon re-evaluation, pt presents with improvements of R shoulder ROM and strength. Pt also reports improvements of pain and function. Pt does continue to demonstrate some lingering tightness of R shoulder and some continued rotator cuff weakness. Pt also reports continued tingling in R shoulder that was unaltered by cervical testing this session, but pt does demonstrate some R-sided median nerve sensitivity. Pt still has some functional limitations of houlder and decreased strength of R rotator cuff muscles and scapular stabilizers. Pt's impairments are resulting in limitations with lifting and carrying objects, overhead activity, and work/household activities. PT POC will continue to focus on improving R rotator cuff strength/stability, as well as promoting proper upright posture and reducing nerve sx. Pt is a good candidate for continued skilled PT to address impairments and facilitate return to prior level of function. Impairments: abnormal muscle tone, abnormal or restricted ROM, impaired physical strength, pain with function and poor posture   Functional limitations:  lifting and carrying objects, overhead activity, and work/household activities  Symptom irritability: moderateUnderstanding of Dx/Px/POC: good   Prognosis: good    Goals  STG 4 - weeks  1. Patient will demonstrate improved R shoulder flexion/abduction ROM by 10 degrees or more to facilitate functional ability. -met  2.  Patient will demonstrate improved R shoulder strength to 4/5 or better in all planes to facilitate functional ability. -met  LTG 8 - weeks  1. Patient will demonstrate ability to lift 10 pounds or more overhead with good R shoulder mechanics to facilitate functional ability. -met  2. Patient will demonstrate increased FOTO score from 36 at baseline to predicted score of 66 or higher to facilitate functional ability. -ongoing    Plan  Patient would benefit from: skilled physical therapy and PT eval  Planned modality interventions: cryotherapy, thermotherapy: hydrocollator packs and unattended electrical stimulation  Planned therapy interventions: ADL retraining, activity modification, functional ROM exercises, home exercise program, joint mobilization, manual therapy, massage, neuromuscular re-education, patient education, strengthening, therapeutic exercise, therapeutic activities, stretching, postural training and nerve gliding  Frequency: 2x week  Duration in weeks: 8  Plan of Care beginning date: 7/11/2023  Plan of Care expiration date: 9/5/2023  Treatment plan discussed with: patient        Subjective Evaluation    History of Present Illness  Mechanism of injury: Pt reports R shoulder pain secondary to fall at work on 4/13. Pt fell backwards and reached her R arm back to brace her fall. Pt had instant pain and went to ED and x-rays were negative. Pt placed in sling and then saw ortho who removed her from sling. Pt initially in a lot of pain which then slightly improved over time. Upon re-evaluation, pt reports significant improvement of her R shoulder. Pt reports improved strength and has been able to do more at work. Pt does still get some pain with repetitive and heavy lifting activities. Pt also reports she is still unable to lay on her R side, arm can be under pillow or under her side but either way her R arm will get tingly and numb within 2 minutes. Pt no longer needs prescription naproxen.  Pt feels about 80% back to normal.   Patient Goals  Patient goals for therapy: increased motion and decreased pain  Patient goal: Move her R arm without hurting  Pain  Current pain ratin  At best pain ratin  At worst pain ratin  Location: R shoulder and medial upper arm  Quality: throbbing (Thumping)  Relieving factors: rest  Aggravating factors: lifting and overhead activity  Progression: improved          Objective     Postural Observations  Seated posture: poor  Standing posture: poor    Additional Postural Observation Details  Forward head/rounded shoulder    Palpation   Left   No palpable tenderness to the upper trapezius. Right   Hypertonic in the upper trapezius. Tenderness     Additional Tenderness Details  No TTP R shoulder    Active Range of Motion   Cervical/Thoracic Spine       Cervical    Left lateral flexion: 30 degrees      Right lateral flexion: 25 degrees     with pain  Left Shoulder   Internal rotation BTB: T7     Right Shoulder   Flexion: 142 degrees   Abduction: 120 degrees   External rotation 90°: 92 degrees  Internal rotation 90°: 55 degrees   Internal rotation BTB: T12   Mechanical Assessment    Cervical    Seated retraction: repeated movements   Pain location: no change  Seated Flexion:  repeated movements  Pain location: no change  Seated right sidebend: sustained positions  Pain location: no change    Thoracic      Lumbar      Strength/Myotome Testing     Left Shoulder     Planes of Motion   Flexion: 5   Abduction: 5   External rotation at 0°: 5   External rotation at 90°: 4   Internal rotation at 0°: 5   Internal rotation at 45°: 4+     Right Shoulder     Planes of Motion   Flexion: 4+   Abduction: 4   External rotation at 0°: 4   External rotation at 90°: 4-   Internal rotation at 0°: 5   Internal rotation at 45°: 4     Left Elbow   Flexion: 4+    Right Elbow   Flexion: 4+    Tests     Right Shoulder   Positive Hawkin's, Neer's, passive horizontal adduction and ULTT1. Negative empty can.               Precautions: N/A    POC expires Auth Status Unit limit Start date  Expiration date PT/OT + Visit Limit? 9/5 approved N/A 5/9/23 7/31/23 [facility]             Visit/Unit Tracking  AUTH Status:  Date 5/9 5/16 5/25 6/6 6/8 6/20  FOTO 6/27 7/11       12 visits Used      6  8        Remaining       6  4             Manuals 5/9 5/16 5/25 6/6 6/8 6/20 6/27 7/11     PROM R shoulder flex/abd    6' 4' 4' KL 4'     R shoulder inferior mobs    3' end range 2' 3'       R median nerve glide      2' distal glide  4'                  Neuro Re-Ed             Pt education  10'   4' 3' 2' R median nerve glide 10x 5'     Corner pinches 1x10 5" 2x10 5" 2x10 5"    2x10 5" NP     Chin tucks 1x10 5" 2x10 5" 2x10 5" 2x10 5"  2x10 5" 2x10 5" NV c OP     Serratus punch  supine 2x10 3" supine 2x10 3"  2x10 against wall 2x10 against wall 2x10 against wall      No moneys  [place] 2x10  orange 3x10           TB rows     3x10 blue 3x10 blk 3x10 blk 3x10 10.5#     Prone I/T     NV 1x10 3" ea. 1x10 3" ea.  NV     Ther Ex             Wall slides      1x15 5" 1x20 5" 1x20 5"     Pulleys  3' scaption  3' scaptio 3' scaptio         Sleeper stretch    10x10"    NV     UBE    2'/3' 3'/3' lvl 1.3 3'/3' lvl 1.5 3'/3' lvl 3 fwd  lvl 1.5 bwd 3'/3' lvl 2     TB ER     2x10 ylw 2x15 grn NP 2x15 grn 2x15 grn     TB ER punch up     NV x10 red x10 red x10 red     Ther Activity             Box lifts overhead to stairs c PT  + squat mechanics  2'  Box only 10x + squat mechanics  2'  Box only 10x 2x5 overhead passes to stairs; box only 1x5 box, 1x5 box+3# NP       Standing flexion to 90     2x10 5#         Gait Training                                       Modalities

## 2023-07-13 ENCOUNTER — APPOINTMENT (OUTPATIENT)
Dept: PHYSICAL THERAPY | Facility: CLINIC | Age: 33
End: 2023-07-13
Payer: COMMERCIAL

## 2023-08-10 ENCOUNTER — TELEPHONE (OUTPATIENT)
Dept: NEUROLOGY | Facility: CLINIC | Age: 33
End: 2023-08-10

## 2023-09-11 ENCOUNTER — TELEPHONE (OUTPATIENT)
Dept: NEUROLOGY | Facility: CLINIC | Age: 33
End: 2023-09-11